# Patient Record
Sex: FEMALE | Race: WHITE | Employment: FULL TIME | URBAN - METROPOLITAN AREA
[De-identification: names, ages, dates, MRNs, and addresses within clinical notes are randomized per-mention and may not be internally consistent; named-entity substitution may affect disease eponyms.]

---

## 2017-03-11 ENCOUNTER — OFFICE VISIT (OUTPATIENT)
Dept: INTERNAL MEDICINE CLINIC | Facility: CLINIC | Age: 52
End: 2017-03-11

## 2017-03-11 VITALS
SYSTOLIC BLOOD PRESSURE: 114 MMHG | HEIGHT: 65 IN | BODY MASS INDEX: 21.83 KG/M2 | DIASTOLIC BLOOD PRESSURE: 71 MMHG | WEIGHT: 131 LBS | HEART RATE: 65 BPM

## 2017-03-11 DIAGNOSIS — Z11.9 ENCOUNTER FOR SCREENING EXAMINATION FOR INFECTIOUS DISEASE: Primary | ICD-10-CM

## 2017-03-11 PROCEDURE — 99212 OFFICE O/P EST SF 10 MIN: CPT | Performed by: INTERNAL MEDICINE

## 2017-03-11 PROCEDURE — 99213 OFFICE O/P EST LOW 20 MIN: CPT | Performed by: INTERNAL MEDICINE

## 2017-03-11 NOTE — PROGRESS NOTES
Delta Angelo is a 49year old female. 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   No specific testing is r

## 2017-11-07 ENCOUNTER — APPOINTMENT (OUTPATIENT)
Dept: OCCUPATIONAL MEDICINE | Age: 52
End: 2017-11-07
Attending: EMERGENCY MEDICINE

## 2017-11-09 ENCOUNTER — APPOINTMENT (OUTPATIENT)
Dept: OTHER | Facility: HOSPITAL | Age: 52
End: 2017-11-09
Attending: ORTHOPAEDIC SURGERY

## 2017-11-16 ENCOUNTER — APPOINTMENT (OUTPATIENT)
Dept: OTHER | Facility: HOSPITAL | Age: 52
End: 2017-11-16
Attending: EMERGENCY MEDICINE

## 2017-11-22 ENCOUNTER — APPOINTMENT (OUTPATIENT)
Dept: OTHER | Facility: HOSPITAL | Age: 52
End: 2017-11-22
Attending: EMERGENCY MEDICINE

## 2017-11-29 ENCOUNTER — APPOINTMENT (OUTPATIENT)
Dept: OTHER | Facility: HOSPITAL | Age: 52
End: 2017-11-29
Attending: EMERGENCY MEDICINE

## 2020-09-23 ENCOUNTER — NURSE TRIAGE (OUTPATIENT)
Dept: INTERNAL MEDICINE CLINIC | Facility: CLINIC | Age: 55
End: 2020-09-23

## 2020-09-23 ENCOUNTER — TELEMEDICINE (OUTPATIENT)
Dept: INTERNAL MEDICINE CLINIC | Facility: CLINIC | Age: 55
End: 2020-09-23
Payer: COMMERCIAL

## 2020-09-23 DIAGNOSIS — J01.80 ACUTE NON-RECURRENT SINUSITIS OF OTHER SINUS: Primary | ICD-10-CM

## 2020-09-23 PROCEDURE — 99213 OFFICE O/P EST LOW 20 MIN: CPT | Performed by: PHYSICIAN ASSISTANT

## 2020-09-23 RX ORDER — AZITHROMYCIN 250 MG/1
TABLET, FILM COATED ORAL
Qty: 6 TABLET | Refills: 0 | Status: SHIPPED | OUTPATIENT
Start: 2020-09-23 | End: 2020-09-28

## 2020-09-23 NOTE — TELEPHONE ENCOUNTER
Action Requested: Summary for Provider     []  Critical Lab, Recommendations Needed  [] Need Additional Advice  []   FYI    []   Need Orders  [] Need Medications Sent to Pharmacy  []  Other     SUMMARY: Doximity today with Jean Cagle.  PA    Reason for jacquelyn

## 2020-09-23 NOTE — PROGRESS NOTES
This is a telemedicine visit with live, interactive video and audio. Patient understands and accepts financial responsibility for any deductible, co-insurance and/or co-pays associated with this service.     SUBJECTIVE  Sinus pain and pressure since Sun and tongue normal; teeth and gums normal, Speaking in full sentences comfortably and Normal work of breathing       ASSESSMENT & PLAN  Diagnoses and all orders for this visit:    Acute non-recurrent sinusitis of other sinus  -fluids, warm steam, otc decong

## 2021-05-19 ENCOUNTER — TELEPHONE (OUTPATIENT)
Dept: INTERNAL MEDICINE CLINIC | Facility: CLINIC | Age: 56
End: 2021-05-19

## 2021-05-19 NOTE — TELEPHONE ENCOUNTER
Patient due for annual mammogram screening, order in EMR 2/11/21. Left message for patient to call office back, please transfer call to ext [phone] when she calls office back.

## 2021-05-28 NOTE — TELEPHONE ENCOUNTER
Patient due for annual mammogram screening, order in EMR 2/11/21.   Left message for patient to call office back, please transfer call to ext [phone] when she calls office back

## 2021-12-27 ENCOUNTER — TELEPHONE (OUTPATIENT)
Dept: INTERNAL MEDICINE CLINIC | Facility: CLINIC | Age: 56
End: 2021-12-27

## 2021-12-27 DIAGNOSIS — Z11.52 ENCOUNTER FOR SCREENING FOR COVID-19: Primary | ICD-10-CM

## 2021-12-27 NOTE — TELEPHONE ENCOUNTER
Spoke to patient. Her 's rapid test came back positive. She would like an order for Covid test.     She is mildly symptomatic with some body aches. Reviewed patient's chart for medical history and placed Covid test per protocol.

## 2022-05-08 ENCOUNTER — TELEPHONE (OUTPATIENT)
Dept: INTERNAL MEDICINE CLINIC | Facility: CLINIC | Age: 57
End: 2022-05-08

## 2022-05-09 NOTE — TELEPHONE ENCOUNTER
Please send letter  Screening for colon cancer saves lives. You are due for your colonoscopy. An order has been placed to make an appointment with our gastroenterologist team. Please call [phone]. If you are reluctant to have this procedure please  a FIT card from the lab and submit a stool sample that also screens for colon cancer.     AKHIL Pimentel  Working with Penny Yepez

## 2022-10-26 ENCOUNTER — IMMUNIZATION (OUTPATIENT)
Dept: LAB | Age: 57
End: 2022-10-26
Attending: EMERGENCY MEDICINE
Payer: COMMERCIAL

## 2022-10-26 DIAGNOSIS — Z23 NEED FOR VACCINATION: Primary | ICD-10-CM

## 2022-10-26 PROCEDURE — 0134A SARSCOV2 VAC BVL 50MCG/0.5ML: CPT

## 2023-07-28 ENCOUNTER — LAB ENCOUNTER (OUTPATIENT)
Dept: LAB | Age: 58
End: 2023-07-28
Attending: INTERNAL MEDICINE
Payer: COMMERCIAL

## 2023-07-28 DIAGNOSIS — E55.9 VITAMIN D DEFICIENCY: ICD-10-CM

## 2023-07-28 DIAGNOSIS — R53.83 FATIGUE, UNSPECIFIED TYPE: ICD-10-CM

## 2023-07-28 LAB
ALBUMIN SERPL-MCNC: 3.5 G/DL (ref 3.4–5)
ALBUMIN/GLOB SERPL: 0.9 {RATIO} (ref 1–2)
ALP LIVER SERPL-CCNC: 71 U/L
ALT SERPL-CCNC: 29 U/L
ANION GAP SERPL CALC-SCNC: 9 MMOL/L (ref 0–18)
AST SERPL-CCNC: 23 U/L (ref 15–37)
BASOPHILS # BLD AUTO: 0.02 X10(3) UL (ref 0–0.2)
BASOPHILS NFR BLD AUTO: 0.3 %
BILIRUB SERPL-MCNC: 0.4 MG/DL (ref 0.1–2)
BUN BLD-MCNC: 13 MG/DL (ref 7–18)
BUN/CREAT SERPL: 13.5 (ref 10–20)
CALCIUM BLD-MCNC: 9.8 MG/DL (ref 8.5–10.1)
CHLORIDE SERPL-SCNC: 106 MMOL/L (ref 98–112)
CO2 SERPL-SCNC: 25 MMOL/L (ref 21–32)
CREAT BLD-MCNC: 0.96 MG/DL
DEPRECATED RDW RBC AUTO: 41.4 FL (ref 35.1–46.3)
EGFRCR SERPLBLD CKD-EPI 2021: 69 ML/MIN/1.73M2 (ref 60–?)
EOSINOPHIL # BLD AUTO: 0.09 X10(3) UL (ref 0–0.7)
EOSINOPHIL NFR BLD AUTO: 1.5 %
ERYTHROCYTE [DISTWIDTH] IN BLOOD BY AUTOMATED COUNT: 12 % (ref 11–15)
FASTING STATUS PATIENT QL REPORTED: YES
GLOBULIN PLAS-MCNC: 3.7 G/DL (ref 2.8–4.4)
GLUCOSE BLD-MCNC: 84 MG/DL (ref 70–99)
HCT VFR BLD AUTO: 42.7 %
HGB BLD-MCNC: 14.3 G/DL
IMM GRANULOCYTES # BLD AUTO: 0.01 X10(3) UL (ref 0–1)
IMM GRANULOCYTES NFR BLD: 0.2 %
LYMPHOCYTES # BLD AUTO: 2 X10(3) UL (ref 1–4)
LYMPHOCYTES NFR BLD AUTO: 32.4 %
MCH RBC QN AUTO: 31.5 PG (ref 26–34)
MCHC RBC AUTO-ENTMCNC: 33.5 G/DL (ref 31–37)
MCV RBC AUTO: 94.1 FL
MONOCYTES # BLD AUTO: 0.44 X10(3) UL (ref 0.1–1)
MONOCYTES NFR BLD AUTO: 7.1 %
NEUTROPHILS # BLD AUTO: 3.61 X10 (3) UL (ref 1.5–7.7)
NEUTROPHILS # BLD AUTO: 3.61 X10(3) UL (ref 1.5–7.7)
NEUTROPHILS NFR BLD AUTO: 58.5 %
OSMOLALITY SERPL CALC.SUM OF ELEC: 289 MOSM/KG (ref 275–295)
PLATELET # BLD AUTO: 190 10(3)UL (ref 150–450)
POTASSIUM SERPL-SCNC: 4.9 MMOL/L (ref 3.5–5.1)
PROT SERPL-MCNC: 7.2 G/DL (ref 6.4–8.2)
RBC # BLD AUTO: 4.54 X10(6)UL
SODIUM SERPL-SCNC: 140 MMOL/L (ref 136–145)
TSI SER-ACNC: 1.47 MIU/ML (ref 0.36–3.74)
VIT B12 SERPL-MCNC: 247 PG/ML (ref 193–986)
VIT D+METAB SERPL-MCNC: 27.6 NG/ML (ref 30–100)
WBC # BLD AUTO: 6.2 X10(3) UL (ref 4–11)

## 2023-07-28 PROCEDURE — 80053 COMPREHEN METABOLIC PANEL: CPT

## 2023-07-28 PROCEDURE — 84443 ASSAY THYROID STIM HORMONE: CPT

## 2023-07-28 PROCEDURE — 82306 VITAMIN D 25 HYDROXY: CPT

## 2023-07-28 PROCEDURE — 36415 COLL VENOUS BLD VENIPUNCTURE: CPT

## 2023-07-28 PROCEDURE — 82607 VITAMIN B-12: CPT

## 2023-07-28 PROCEDURE — 85025 COMPLETE CBC W/AUTO DIFF WBC: CPT

## 2024-03-06 ENCOUNTER — TELEMEDICINE (OUTPATIENT)
Dept: INTERNAL MEDICINE CLINIC | Facility: CLINIC | Age: 59
End: 2024-03-06

## 2024-03-06 ENCOUNTER — TELEPHONE (OUTPATIENT)
Dept: INTERNAL MEDICINE CLINIC | Facility: CLINIC | Age: 59
End: 2024-03-06

## 2024-03-06 DIAGNOSIS — H57.11 DISCOMFORT OF RIGHT EYE: Primary | ICD-10-CM

## 2024-03-06 PROCEDURE — 99213 OFFICE O/P EST LOW 20 MIN: CPT | Performed by: INTERNAL MEDICINE

## 2024-03-06 NOTE — PROGRESS NOTES
Virtual Virtual Check-In    Irene Mcrae verbally consents to a Virtual Video Check-In service on 03/06/24. Patient understands and accepts financial responsibility for any deductible, co-insurance and/or co-pays associated with this service. This visit is conducted using Telemedicine with live, interactive video and audio.     I spoke with Irene Mcrae by secure video chat, verified date of birth, and discussed their current concerns:   Duration: 10 minutes    HPI  58-year-old lady requesting a telemedicine consultation for a foreign body sensation in the right eye.  She thought it is conjunctivitis.  She has no redness or purulent discharge.  She feels something is there in the eye and just irritation.  No eye pain.  No blurry vision.    Review of Systems:   Review of Systems   Constitutional:  Negative for fever.   HENT:  Negative for congestion.    Eyes:  Positive for itching. Negative for photophobia, pain, redness and visual disturbance.   Respiratory:  Negative for cough and shortness of breath.    Cardiovascular:  Negative for chest pain.   Psychiatric/Behavioral:  Negative for behavioral problems.           Reviewed Active Problems:  There are no problems to display for this patient.     Reviewed Past Medical History:  Past Medical History:   Diagnosis Date    Dry eyes     per NG: \"(?etiology)\" - restasis drops, 1974    Lipid screening 7/25/2011      Reviewed Family History:  Family History   Problem Relation Age of Onset    Dementia Mother         Alzheimer's Disease    Alcohol and Other Disorders Associated Mother         alcoholism    Heart Disease Father 71        CAD    Clotting Disorder Father         blood clot in renal artery    Heart Disorder Sister         endocarditis, 8/2011    Asthma Son        Reviewed Social History:  Social History     Socioeconomic History    Marital status:    Tobacco Use    Smoking status: Never    Smokeless tobacco: Never   Substance and Sexual  Activity    Alcohol use: Yes     Comment: social     Drug use: No    Sexual activity: Yes     Partners: Male     Birth control/protection: Vasectomy     Comment: 10/17/19: current    Other Topics Concern    Caffeine Concern Yes     Comment: coffee, 4 cups/day      Reviewed Current Medications:  Current Outpatient Medications   Medication Sig Dispense Refill    escitalopram 10 MG Oral Tab Take 1 tablet (10 mg total) by mouth daily. 30 tablet 5    PREMPRO 0.625-5 MG Oral Tab TAKE 1 TABLET BY MOUTH EVERY DAY 84 tablet 0          Physical Exam  There were no vitals filed for this visit.  Physical Exam   Constitutional:       General: He is not in acute distress.     Appearance: Normal appearance.   HENT:      Head: Normocephalic and atraumatic.      Nose: Nose normal.   Neurological:      Mental Status: He is alert and oriented to person, place, and time.   Psychiatric:         Behavior: Behavior normal.         Thought Content: Thought content normal.         Judgment: Judgment normal.     Eye exam through the video  No redness noted.  Extraocular movements intact.    Diagnosis:  1. Discomfort of right eye  Most likely foreign body -as there is no redness or purulent discharge, I have reassured her that it is not conjunctivitis.  If she develops anything, she will contact me.  We will do artificial tears.  If there is no improvement, she needs ophthalmology evaluation.  She will contact ophthalmology for an evaluation volodymyr make an appointment to see for next week.     Plan: As above.  I have discussed with her regarding mammography.  She has completed colonoscopy in duly.  We will update the health maintenance.    Follow up: No follow-ups on file.    Please note that the following visit was completed using two-way, real-time interactive audio and/or video communication.  This has been done in good ryan to provide continuity of care in the best interest of the provider-patient relationship, due to the ongoing public  health crisis/national emergency and because of restrictions of visitation.  There are limitations of this visit as no physical exam could be performed.  Every conscious effort was taken to allow for sufficient and adequate time.  This billing was spent on reviewing labs, medications, radiology tests and decision making.  Appropriate medical decision-making and tests are ordered as detailed in the plan of care above. Irene TRAVIS Mcrae understands video evaluation is not a substitute for in person examination or emergency care. Patient advised to go to ER or call 911 for worsening symptoms or acute distress.     This note was prepared using Dragon Medical voice recognition dictation software. As a result errors may occur. When identified these errors have been corrected. While every attempt is made to correct errors during dictation discrepancies may still exist.  Raphael Calabrese MD

## 2024-03-06 NOTE — TELEPHONE ENCOUNTER
Verified name and .    Patient states she woke up with discomfort and drainage to right eye. She is requesting prescription to treat pink eye.    Appointment scheduled:  Future Appointments   Date Time Provider Department Center   3/6/2024  2:15 PM Raphael Calabrese MD [facility]   3/20/2024  9:00 AM Raphael Park MD [facility]

## 2024-03-20 ENCOUNTER — OFFICE VISIT (OUTPATIENT)
Dept: INTERNAL MEDICINE CLINIC | Facility: CLINIC | Age: 59
End: 2024-03-20
Payer: COMMERCIAL

## 2024-03-20 VITALS
RESPIRATION RATE: 16 BRPM | DIASTOLIC BLOOD PRESSURE: 70 MMHG | BODY MASS INDEX: 25.79 KG/M2 | SYSTOLIC BLOOD PRESSURE: 110 MMHG | HEART RATE: 78 BPM | WEIGHT: 154.81 LBS | TEMPERATURE: 98 F | HEIGHT: 65 IN

## 2024-03-20 DIAGNOSIS — E55.9 VITAMIN D DEFICIENCY: ICD-10-CM

## 2024-03-20 DIAGNOSIS — Z12.31 ENCOUNTER FOR SCREENING MAMMOGRAM FOR BREAST CANCER: ICD-10-CM

## 2024-03-20 DIAGNOSIS — D12.6 ADENOMATOUS POLYP OF COLON, UNSPECIFIED PART OF COLON: ICD-10-CM

## 2024-03-20 DIAGNOSIS — F32.1 CURRENT MODERATE EPISODE OF MAJOR DEPRESSIVE DISORDER WITHOUT PRIOR EPISODE (HCC): ICD-10-CM

## 2024-03-20 DIAGNOSIS — Z23 NEED FOR VACCINATION: ICD-10-CM

## 2024-03-20 DIAGNOSIS — Z00.00 ROUTINE PHYSICAL EXAMINATION: Primary | ICD-10-CM

## 2024-03-20 PROCEDURE — 99396 PREV VISIT EST AGE 40-64: CPT | Performed by: INTERNAL MEDICINE

## 2024-03-20 PROCEDURE — 90471 IMMUNIZATION ADMIN: CPT | Performed by: INTERNAL MEDICINE

## 2024-03-20 PROCEDURE — 90715 TDAP VACCINE 7 YRS/> IM: CPT | Performed by: INTERNAL MEDICINE

## 2024-03-20 RX ORDER — ESCITALOPRAM OXALATE 20 MG/1
20 TABLET ORAL DAILY
Qty: 90 TABLET | Refills: 1 | Status: SHIPPED | OUTPATIENT
Start: 2024-03-20

## 2024-03-20 NOTE — PROGRESS NOTES
HPI:    Patient ID: Irene Mcrae is a 58 year old female.    HPI  Patient is here requesting a physical exam.  Last seen here in December of last year for follow-up on depression and anxiety.  At that time we continued the same dose of escitalopram 10 mg a day.  She was doing counseling at that time.  Current medications reviewed.  Health maintenance and vaccination status reviewed.  Patient had blood work done in July of last year for fatigue.  Unremarkable other than slightly low vitamin D level.     Pt is doing well. SHe feels good physically. Sometimes still a struggle with the mood; has issues with anxiety and depression. Still seeing a counselor. Her mornings can be tough for her. Sometimes has issues with leaving the house. No suicidal thoughts. Diet is good for dinner but inconsistent otherwise; she feels it is generally healthy. Not a lot of exercise. Weight is stable. No tobacco. EtOH is one glass of wine a day. No other drug use.  In reviewing the chart, the patient had colonoscopy October 7, 2022.  They revealed 1 tubular adenoma polyp.  Not clear what the time course was on follow-up.      There is no problem list on file for this patient.         HISTORY:  Past Medical History:   Diagnosis Date    Dry eyes     per NG: \"(?etiology)\" - restasis drops, 1974    Lipid screening 7/25/2011      Past Surgical History:   Procedure Laterality Date    D & C  2003    miscarriage    INNER EAR SURGERY PROC UNLISTED      bilateral myringotomy and tube placement    OTHER SURGICAL HISTORY  2005    cervical polypectomy    OTHER SURGICAL HISTORY  2005    per NG: fibroidectomy    T&A      TONSILLECTOMY        Family History   Problem Relation Age of Onset    Dementia Mother         Alzheimer's Disease    Alcohol and Other Disorders Associated Mother         alcoholism    Heart Disease Father 71        CAD    Clotting Disorder Father         blood clot in renal artery    Heart Disorder Sister         endocarditis,  8/2011    Asthma Son       Social History     Socioeconomic History    Marital status:    Tobacco Use    Smoking status: Never    Smokeless tobacco: Never   Vaping Use    Vaping Use: Never used   Substance and Sexual Activity    Alcohol use: Yes     Alcohol/week: 7.0 standard drinks of alcohol     Types: 7 Glasses of wine per week     Comment: social     Drug use: No    Sexual activity: Yes     Partners: Male     Birth control/protection: Vasectomy     Comment: 10/17/19: current    Other Topics Concern    Caffeine Concern Yes     Comment: coffee, 4 cups/day          Review of Systems          Current Outpatient Medications   Medication Sig Dispense Refill    escitalopram 10 MG Oral Tab Take 1 tablet (10 mg total) by mouth daily. 30 tablet 5    PREMPRO 0.625-5 MG Oral Tab TAKE 1 TABLET BY MOUTH EVERY DAY 84 tablet 0     Allergies:  Allergies   Allergen Reactions    Shellfish Allergy RASH    Latex SHORTNESS OF BREATH    Shellfish-Derived Products RASH and SWELLING        PHYSICAL EXAM:   /70 (BP Location: Left arm, Patient Position: Sitting, Cuff Size: large)   Pulse 78   Temp 97.8 °F (36.6 °C) (Other)   Resp 16   Ht 5' 5\" (1.651 m)   Wt 154 lb 12.8 oz (70.2 kg)   LMP 02/15/2015   BMI 25.76 kg/m²      Physical Exam  Constitutional:       Appearance: Normal appearance. She is well-developed.   HENT:      Right Ear: Tympanic membrane and ear canal normal.      Left Ear: Tympanic membrane and ear canal normal.      Nose: Nose normal.      Mouth/Throat:      Pharynx: No oropharyngeal exudate or posterior oropharyngeal erythema.   Eyes:      Conjunctiva/sclera: Conjunctivae normal.      Pupils: Pupils are equal, round, and reactive to light.   Neck:      Thyroid: No thyromegaly.      Vascular: No carotid bruit.   Cardiovascular:      Rate and Rhythm: Normal rate.      Pulses: Normal pulses.      Heart sounds: Normal heart sounds. No murmur heard.     No friction rub. No gallop.   Pulmonary:      Effort:  Pulmonary effort is normal.      Breath sounds: Normal breath sounds. No wheezing or rales.   Abdominal:      General: Bowel sounds are normal.      Palpations: Abdomen is soft. There is no mass.      Tenderness: There is no abdominal tenderness.   Musculoskeletal:         General: No tenderness.      Cervical back: Neck supple.      Right lower leg: No edema.      Left lower leg: No edema.   Lymphadenopathy:      Cervical: No cervical adenopathy.   Skin:     General: Skin is warm and dry.      Findings: No rash.   Neurological:      General: No focal deficit present.      Mental Status: She is alert.      Cranial Nerves: No cranial nerve deficit.      Coordination: Coordination normal.   Psychiatric:         Mood and Affect: Mood normal.         Behavior: Behavior normal.         Thought Content: Thought content normal.         Judgment: Judgment normal.          Wt Readings from Last 6 Encounters:   03/20/24 154 lb 12.8 oz (70.2 kg)   12/18/23 153 lb (69.4 kg)   07/28/23 154 lb 3.2 oz (69.9 kg)   02/11/21 150 lb (68 kg)   10/17/19 150 lb (68 kg)   03/11/17 131 lb (59.4 kg)             ASSESSMENT/PLAN:   1. Routine physical examination  Physical exam is unremarkable.  Active issues as below.  Health maintenance issues reviewed.  Will check fasting blood work and contact patient with results.  Strongly encouraged increase in exercise as part of an overall approach to improve her physical and mental wellbeing.  Discussed the importance of proper diet, adequate sleep, stress management.  But in particular, she should focus on exercise.  - CBC With Differential With Platelet; Future  - Comp Metabolic Panel (14); Future  - Lipid Panel; Future  - TSH W Reflex To Free T4; Future  - Vitamin D; Future    2. Current moderate episode of major depressive disorder without prior episode (HCC)  Somewhat controlled but not great.  Various options discussed.  Increase Lexapro from 10 up to 20 mg a day.  Continue following up with  counselor/therapist.  Follow-up here in about 2 months to recheck with her control.    3. Vitamin D deficiency  Check levels.  Adjust supplementation if needed.  - Vitamin D; Future    4. Adenomatous polyp of colon, unspecified part of colon  Colonoscopy done in October 2022.  Patient advised to track down the recommendations of the GI doctor at that time in regard to repeat colonoscopy.      5. Need for vaccination   Vaccination status reviewed.  Given tetanus booster today.  She will likely return at some point in the future for Shingles shot.  - TdaP (Boostrix/Adacel) Vaccine (> 7 Y)    6. Encounter for screening mammogram for breast cancer  Given order for mammogram.  Advised to follow-up with gynecology or return here for nurse practitioner pelvic exam.  - [facility] JASON 2D+3D SCREENING BILAT (CPT=77067/25493); Future         Meds This Visit:  Requested Prescriptions      No prescriptions requested or ordered in this encounter       Imaging & Referrals:  None         Raphael Park MD

## 2024-07-31 ENCOUNTER — HOSPITAL ENCOUNTER (OUTPATIENT)
Dept: MAMMOGRAPHY | Facility: HOSPITAL | Age: 59
Discharge: HOME | End: 2024-07-31
Attending: INTERNAL MEDICINE
Payer: COMMERCIAL

## 2024-07-31 DIAGNOSIS — Z12.31 ENCOUNTER FOR SCREENING MAMMOGRAM FOR BREAST CANCER: ICD-10-CM

## 2024-07-31 PROCEDURE — 77067 SCR MAMMO BI INCL CAD: CPT | Performed by: INTERNAL MEDICINE

## 2024-07-31 PROCEDURE — 77063 BREAST TOMOSYNTHESIS BI: CPT | Performed by: INTERNAL MEDICINE

## 2024-09-20 RX ORDER — ESCITALOPRAM OXALATE 20 MG/1
20 TABLET ORAL DAILY
Qty: 90 TABLET | Refills: 0 | Status: SHIPPED | OUTPATIENT
Start: 2024-09-20

## 2024-09-20 NOTE — TELEPHONE ENCOUNTER
Please review; protocol failed/No Protocol  appointment only     Sending 90 day supply last appointment was 6 months ago    Sent [employer] message to patient to schedule an appointment.     Requested Prescriptions   Pending Prescriptions Disp Refills    ESCITALOPRAM 20 MG Oral Tab [Pharmacy Med Name: ESCITALOPRAM 20 MG TABLET] 90 tablet 1     Sig: TAKE 1 TABLET BY MOUTH EVERY DAY       Psychiatric Non-Scheduled (Anti-Anxiety) Failed - 9/17/2024 12:21 AM        Failed - In person appointment or virtual visit in the past 6 mos or appointment in next 3 mos     Recent Outpatient Visits              6 months [facility]    [facility]    6 months ago Discomfort of right eye    [facility]    [facility]    9 months ago Current moderate episode of major depressive disorder without prior episode (HCC)    [facility] Raphael Henry MD    Office Visit    1 year ago Current moderate episode of major depressive disorder without prior episode (HCC)    [facility]    Office Visit    3 years ago Encounter for gynecological examination without abnormal finding    [facility]    [facility]                      Passed - Depression Screening completed within the past 12 months             Recent Outpatient Visits              6 months ago Routine physical examination    [facility]    Office Visit    6 months ago Discomfort of right eye    [facility]    [facility]    9 months ago Current moderate episode of major depressive disorder without prior episode (HCC)    [facility], [facility], Raphael Henry MD    Office Visit    1 year ago Current moderate episode of major depressive disorder without prior episode (HCC)    [facility], [facility], Raphael Henry MD    Office Visit    3 years ago Encounter for gynecological examination without abnormal finding    [facility] - [facility] Roxane Luu MD    Office Visit

## 2024-12-23 ENCOUNTER — TELEPHONE (OUTPATIENT)
Dept: INTERNAL MEDICINE CLINIC | Facility: CLINIC | Age: 59
End: 2024-12-23

## 2024-12-27 NOTE — TELEPHONE ENCOUNTER
Please review. Protocol Failed; No Protocol    No future appointments.    [employer] message sent to patient to schedule an office visit with Provider and/or  Routed to Patient  for assistance with appointment.     Requested Prescriptions   Pending Prescriptions Disp Refills    ESCITALOPRAM 20 MG Oral Tab [Pharmacy Med Name: ESCITALOPRAM 20 MG TABLET] 90 tablet 0     Sig: TAKE 1 TABLET BY MOUTH EVERY DAY       Psychiatric Non-Scheduled (Anti-Anxiety) Failed - 12/27/2024  4:46 PM        Failed - In person appointment or virtual visit in the past 6 mos or appointment in next 3 mos     Recent Outpatient Visits              9 months ago [facility]    [facility]    9 months ago Discomfort of right eye    [facility]    [facility]    1 year ago Current moderate episode of major depressive disorder without prior episode (HCC)    [facility], Raphael Henry MD    Office Visit    1 year ago Current moderate episode of major depressive disorder without prior episode (HCC)    [facility]    [facility] Visit    3 years ago Encounter for gynecological examination without abnormal finding    [facility] Kimberly J, [facility]    Office Visit                      Passed - Depression Screening completed within the past 12 months                 Recent Outpatient Visits              9 months ago Routine physical examination    [facility]    Office Visit    9 months ago Discomfort of right eye    [facility]    [facility]    1 year ago Current moderate episode of major depressive disorder  without prior episode (HCC)    [facility], [facility], Raphael Henry MD    Office Visit    1 year ago Current moderate episode of major depressive disorder without prior episode (HCC)    [facility], Raphael Henry MD    Office Visit    3 years ago Encounter for gynecological examination without abnormal finding    [facility]    [facility] Visit

## 2024-12-29 RX ORDER — ESCITALOPRAM OXALATE 20 MG/1
20 TABLET ORAL DAILY
Qty: 30 TABLET | Refills: 1 | Status: SHIPPED | OUTPATIENT
Start: 2024-12-29 | End: 2025-01-30

## 2025-02-01 RX ORDER — ESCITALOPRAM OXALATE 20 MG/1
20 TABLET ORAL DAILY
Qty: 30 TABLET | Refills: 1 | Status: SHIPPED | OUTPATIENT
Start: 2025-02-01

## 2025-02-01 NOTE — TELEPHONE ENCOUNTER
To reception staff, patient is over due for  f/u appt.   Also [employer] message sent to pt   See telephone encounter 12-23-24.  Thanks     Refill Passed Per Protocol    Requested Prescriptions   Pending Prescriptions Disp Refills    escitalopram 20 MG Oral Tab 30 tablet 1     Sig: Take 1 tablet (20 mg total) by mouth daily.       Psychiatric Non-Scheduled (Anti-Anxiety) Passed - 1/31/2025 10:59 PM        Passed - In person appointment or virtual visit in the past 6 mos or appointment in next 3 mos     Recent Outpatient Visits              10 months ago [facility]    [facility]    11 months ago Discomfort of right eye    [facility]    [facility]    1 year ago Current moderate episode of major depressive disorder without prior episode (HCC)    [facility] Raphael Park MD    Office Visit    1 year ago Current moderate episode of major depressive disorder without prior episode (HCC)    [facility] Raphael [facility] MD    Office Visit    3 years ago Encounter for gynecological examination without abnormal finding    [facility]    [facility]          Future Appointments         Provider Department Appt Notes    In 2 months [facility]                    Passed - Depression Screening completed within the past 12 months        Passed - Medication is active on med list             Future Appointments         Provider Department Appt Notes    In 2 months [facility]          Recent Outpatient Visits              10 months ago Routine physical examination    [place]  [facility], [facility], Raphael Henry MD    Office Visit    11 months ago Discomfort of right eye    [facility], Raphael Gallardo MD    Telemedicine    1 year ago Current moderate episode of major depressive disorder without prior episode (HCC)    [facility], Raphael Henry MD    Office Visit    1 year ago Current moderate episode of major depressive disorder without prior episode (HCC)    [facility], Raphael Henry MD    Office Visit    3 years ago Encounter for gynecological examination without abnormal finding    [facility]    [facility] Visit

## 2025-04-30 ENCOUNTER — OFFICE VISIT (OUTPATIENT)
Dept: INTERNAL MEDICINE CLINIC | Facility: CLINIC | Age: 60
End: 2025-04-30

## 2025-04-30 ENCOUNTER — LAB ENCOUNTER (OUTPATIENT)
Dept: LAB | Age: 60
End: 2025-04-30
Attending: INTERNAL MEDICINE
Payer: COMMERCIAL

## 2025-04-30 VITALS
HEART RATE: 78 BPM | TEMPERATURE: 98 F | RESPIRATION RATE: 18 BRPM | OXYGEN SATURATION: 98 % | SYSTOLIC BLOOD PRESSURE: 102 MMHG | HEIGHT: 65 IN | WEIGHT: 162 LBS | BODY MASS INDEX: 26.99 KG/M2 | DIASTOLIC BLOOD PRESSURE: 68 MMHG

## 2025-04-30 DIAGNOSIS — Z00.00 ROUTINE PHYSICAL EXAMINATION: ICD-10-CM

## 2025-04-30 DIAGNOSIS — Z00.00 ROUTINE PHYSICAL EXAMINATION: Primary | ICD-10-CM

## 2025-04-30 DIAGNOSIS — D12.6 ADENOMATOUS POLYP OF COLON, UNSPECIFIED PART OF COLON: ICD-10-CM

## 2025-04-30 DIAGNOSIS — F32.1 CURRENT MODERATE EPISODE OF MAJOR DEPRESSIVE DISORDER WITHOUT PRIOR EPISODE (HCC): ICD-10-CM

## 2025-04-30 LAB
ALBUMIN SERPL-MCNC: 4.2 G/DL (ref 3.2–4.8)
ALBUMIN/GLOB SERPL: 1.3 {RATIO} (ref 1–2)
ALP LIVER SERPL-CCNC: 88 U/L (ref 46–118)
ALT SERPL-CCNC: 13 U/L (ref 10–49)
ANION GAP SERPL CALC-SCNC: 8 MMOL/L (ref 0–18)
AST SERPL-CCNC: 23 U/L (ref ?–34)
BASOPHILS # BLD AUTO: 0.02 X10(3) UL (ref 0–0.2)
BASOPHILS NFR BLD AUTO: 0.3 %
BILIRUB SERPL-MCNC: 0.3 MG/DL (ref 0.3–1.2)
BUN BLD-MCNC: 10 MG/DL (ref 9–23)
BUN/CREAT SERPL: 10.1 (ref 10–20)
CALCIUM BLD-MCNC: 9.3 MG/DL (ref 8.7–10.4)
CHLORIDE SERPL-SCNC: 104 MMOL/L (ref 98–112)
CHOLEST SERPL-MCNC: 236 MG/DL (ref ?–200)
CO2 SERPL-SCNC: 27 MMOL/L (ref 21–32)
CREAT BLD-MCNC: 0.99 MG/DL (ref 0.55–1.02)
DEPRECATED RDW RBC AUTO: 40.7 FL (ref 35.1–46.3)
EGFRCR SERPLBLD CKD-EPI 2021: 66 ML/MIN/1.73M2 (ref 60–?)
EOSINOPHIL # BLD AUTO: 0.27 X10(3) UL (ref 0–0.7)
EOSINOPHIL NFR BLD AUTO: 4.4 %
ERYTHROCYTE [DISTWIDTH] IN BLOOD BY AUTOMATED COUNT: 11.9 % (ref 11–15)
FASTING PATIENT LIPID ANSWER: YES
FASTING STATUS PATIENT QL REPORTED: YES
GLOBULIN PLAS-MCNC: 3.2 G/DL (ref 2–3.5)
GLUCOSE BLD-MCNC: 78 MG/DL (ref 70–99)
HCT VFR BLD AUTO: 43.7 % (ref 35–48)
HDLC SERPL-MCNC: 69 MG/DL (ref 40–59)
HGB BLD-MCNC: 14.6 G/DL (ref 12–16)
IMM GRANULOCYTES # BLD AUTO: 0.01 X10(3) UL (ref 0–1)
IMM GRANULOCYTES NFR BLD: 0.2 %
LDLC SERPL CALC-MCNC: 133 MG/DL (ref ?–100)
LYMPHOCYTES # BLD AUTO: 2.04 X10(3) UL (ref 1–4)
LYMPHOCYTES NFR BLD AUTO: 33.1 %
MCH RBC QN AUTO: 31 PG (ref 26–34)
MCHC RBC AUTO-ENTMCNC: 33.4 G/DL (ref 31–37)
MCV RBC AUTO: 92.8 FL (ref 80–100)
MONOCYTES # BLD AUTO: 0.51 X10(3) UL (ref 0.1–1)
MONOCYTES NFR BLD AUTO: 8.3 %
NEUTROPHILS # BLD AUTO: 3.32 X10 (3) UL (ref 1.5–7.7)
NEUTROPHILS # BLD AUTO: 3.32 X10(3) UL (ref 1.5–7.7)
NEUTROPHILS NFR BLD AUTO: 53.7 %
NONHDLC SERPL-MCNC: 167 MG/DL (ref ?–130)
OSMOLALITY SERPL CALC.SUM OF ELEC: 286 MOSM/KG (ref 275–295)
PLATELET # BLD AUTO: 285 10(3)UL (ref 150–450)
POTASSIUM SERPL-SCNC: 4.3 MMOL/L (ref 3.5–5.1)
PROT SERPL-MCNC: 7.4 G/DL (ref 5.7–8.2)
RBC # BLD AUTO: 4.71 X10(6)UL (ref 3.8–5.3)
SODIUM SERPL-SCNC: 139 MMOL/L (ref 136–145)
TRIGL SERPL-MCNC: 193 MG/DL (ref 30–149)
TSI SER-ACNC: 1.46 UIU/ML (ref 0.55–4.78)
VIT B12 SERPL-MCNC: 354 PG/ML (ref 211–911)
VLDLC SERPL CALC-MCNC: 35 MG/DL (ref 0–30)
WBC # BLD AUTO: 6.2 X10(3) UL (ref 4–11)

## 2025-04-30 PROCEDURE — 99396 PREV VISIT EST AGE 40-64: CPT | Performed by: INTERNAL MEDICINE

## 2025-04-30 PROCEDURE — 80053 COMPREHEN METABOLIC PANEL: CPT

## 2025-04-30 PROCEDURE — 80061 LIPID PANEL: CPT

## 2025-04-30 PROCEDURE — 85025 COMPLETE CBC W/AUTO DIFF WBC: CPT

## 2025-04-30 PROCEDURE — 36415 COLL VENOUS BLD VENIPUNCTURE: CPT

## 2025-04-30 PROCEDURE — 82607 VITAMIN B-12: CPT

## 2025-04-30 PROCEDURE — 84443 ASSAY THYROID STIM HORMONE: CPT

## 2025-04-30 RX ORDER — ESCITALOPRAM OXALATE 20 MG/1
20 TABLET ORAL DAILY
Qty: 90 TABLET | Refills: 3 | Status: SHIPPED | OUTPATIENT
Start: 2025-04-30

## 2025-04-30 NOTE — PATIENT INSTRUCTIONS
Contact the office of Dr Amol Lal ([facility]) and find out when you need to repeat the colonoscopy.

## 2025-04-30 NOTE — PROGRESS NOTES
HPI:    Patient ID: Irene Mcrae is a 59 year old female.    HPI    Patient returns to the office today requesting general physical exam as well as  to discuss chronic medical issues as listed on the active problem list below.  Patient last seen in the office by me on March 20 of last year for physical exam.  Having some issues with anxiety and depression at that time.  During the last visit, the following changes were made: Increase in Lexapro from 10 up to 20 mg a day.  Since the last visit, the patient has seen the following doctors: Gynecology. Still seeing counselor every other week.    Today, the patient offers the following complaints: increased stress with  out of a job. Her mood is better. The increased dose of med is helping. She works a lot.   Weight is up 8 pounds from last visit.   Patient describes diet as good.   For exercise, the patient is not that active.   Tobacco and alcohol use reviewed.   Current medications reviewed.   Health maintenance issues reviewed.      Wt Readings from Last 6 Encounters:   04/30/25 162 lb (73.5 kg)   03/20/24 154 lb 12.8 oz (70.2 kg)   12/18/23 153 lb (69.4 kg)   07/28/23 154 lb 3.2 oz (69.9 kg)   02/11/21 150 lb (68 kg)   10/17/19 150 lb (68 kg)       Problem List[1]     HISTORY:  Past Medical History[2]   Past Surgical History[3]   Family History[4]   Short Social Hx on File[5]       Review of Systems        Current Medications[6]  Allergies:Allergies[7]     PHYSICAL EXAM:   /68 (BP Location: Right arm, Patient Position: Sitting, Cuff Size: large)   Pulse 78   Temp 98 °F (36.7 °C) (Other)   Resp 18   Ht 5' 5\" (1.651 m)   Wt 162 lb (73.5 kg)   LMP 02/15/2015   SpO2 98%   BMI 26.96 kg/m²      Physical Exam  Constitutional:       Appearance: Normal appearance. She is well-developed.   HENT:      Right Ear: Tympanic membrane and ear canal normal.      Left Ear: Tympanic membrane and ear canal normal.      Nose: Nose normal.      Mouth/Throat:       Pharynx: No oropharyngeal exudate or posterior oropharyngeal erythema.   Eyes:      Conjunctiva/sclera: Conjunctivae normal.      Pupils: Pupils are equal, round, and reactive to light.   Neck:      Thyroid: No thyromegaly.      Vascular: No carotid bruit.   Cardiovascular:      Rate and Rhythm: Normal rate.      Pulses: Normal pulses.      Heart sounds: Normal heart sounds. No murmur heard.     No friction rub. No gallop.   Pulmonary:      Effort: Pulmonary effort is normal.      Breath sounds: Normal breath sounds. No wheezing or rales.   Abdominal:      General: Bowel sounds are normal.      Palpations: Abdomen is soft. There is no mass.      Tenderness: There is no abdominal tenderness.   Musculoskeletal:         General: No tenderness.      Cervical back: Neck supple.      Right lower leg: No edema.      Left lower leg: No edema.   Lymphadenopathy:      Cervical: No cervical adenopathy.   Skin:     General: Skin is warm and dry.      Findings: No rash.   Neurological:      General: No focal deficit present.      Mental Status: She is alert.      Cranial Nerves: No cranial nerve deficit.      Coordination: Coordination normal.   Psychiatric:         Mood and Affect: Mood normal.         Behavior: Behavior normal.         Thought Content: Thought content normal.         Judgment: Judgment normal.                 ASSESSMENT/PLAN:   1. Routine physical examination physical exam is  Physical exam is unremarkable.  Active issues as below.  Health maintenance issues reviewed.  Will check fasting blood work and contact patient with results.  Patient's weight is up somewhat.  She is in the overweight category.  She wonders about weight loss medications.  She does not overweight enough to warrant those medications.  We did discuss the importance of a long-term commitment to diet, exercise, weight loss.  I think for her, she would do much better with regular exercise.  It would help her weight and also help her  physically and psychologically.  At minimum, lose the 8 pounds she is gained in the last year.  - CBC With Differential With Platelet; Future  - Comp Metabolic Panel (14); Future  - Lipid Panel; Future  - TSH W Reflex To Free T4; Future  - Vitamin B12; Future    2. Current moderate episode of major depressive disorder without prior episode (HCC)  Better on the Lexapro 20 mg a day.  Some increase stress at home recently.  Continue current dose of medication.  Continue regular follow-up with her counselor.    3. Adenomatous polyp of colon, unspecified part of colon  Colonoscopy done in October 2022.  Found tubular adenoma.  It is not clear from the notes when the GI doctor wishes a repeat colonoscopy.  Patient advised to contact their office and clear that up.        Meds This Visit:  Requested Prescriptions     Pending Prescriptions Disp Refills    escitalopram 20 MG Oral Tab 30 tablet 1     Sig: Take 1 tablet (20 mg total) by mouth daily.       Imaging & Referrals:  None         Raphael Park MD        [1]   Patient Active Problem List  Diagnosis    Current moderate episode of major depressive disorder without prior episode (HCC)    Vitamin D deficiency    Adenomatous polyp of colon   [2]   Past Medical History:   Dry eyes    per NG: \"(?etiology)\" - restasis drops, 1974    Lipid screening   [3]   Past Surgical History:  Procedure Laterality Date    D & c  2003    miscarriage    Inner ear surgery proc unlisted      bilateral myringotomy and tube placement    Other surgical history  2005    cervical polypectomy    Other surgical history  2005    per NG: fibroidectomy    T&a      Tonsillectomy     [4]   Family History  Problem Relation Age of Onset    Dementia Mother         Alzheimer's Disease    Alcohol and Other Disorders Associated Mother         alcoholism    Heart Disease Father 71        CAD    Clotting Disorder Father         blood clot in renal artery    Heart Disorder Sister         endocarditis, 8/2011     Asthma Son    [5]   Social History  Socioeconomic History    Marital status:    Tobacco Use    Smoking status: Never    Smokeless tobacco: Never   Vaping Use    Vaping status: Never Used   Substance and Sexual Activity    Alcohol use: Yes     Alcohol/week: 7.0 standard drinks of alcohol     Types: 7 Glasses of wine per week     Comment: social     Drug use: No    Sexual activity: Yes     Partners: Male     Birth control/protection: Vasectomy     Comment: 10/17/19: current    Other Topics Concern    Caffeine Concern Yes     Comment: coffee, 4 cups/day     Social Drivers of Health     Food Insecurity: No Food Insecurity (4/30/2025)    NCSS - Food Insecurity     Worried About Running Out of Food in the Last Year: No     Ran Out of Food in the Last Year: No   Transportation Needs: No Transportation Needs (4/30/2025)    NCSS - Transportation     Lack of Transportation: No   Housing Stability: Not At Risk (4/30/2025)    NCSS - Housing/Utilities     Has Housing: Yes     Worried About Losing Housing: No     Unable to Get Utilities: No   [6]   Current Outpatient Medications   Medication Sig Dispense Refill    escitalopram 20 MG Oral Tab Take 1 tablet (20 mg total) by mouth daily. 30 tablet 1    PREMPRO 0.625-5 MG Oral Tab TAKE 1 TABLET BY MOUTH EVERY DAY 84 tablet 0   [7]   Allergies  Allergen Reactions    Shellfish Allergy RASH    Latex SHORTNESS OF BREATH    Shellfish-Derived Products RASH and SWELLING

## 2025-05-15 ENCOUNTER — NURSE TRIAGE (OUTPATIENT)
Dept: INTERNAL MEDICINE CLINIC | Facility: CLINIC | Age: 60
End: 2025-05-15

## 2025-05-15 NOTE — TELEPHONE ENCOUNTER
Action Requested: Summary for Provider     []  Critical Lab, Recommendations Needed  [] Need Additional Advice  []   FYI    []   Need Orders  [] Need Medications Sent to Pharmacy  []  Other     SUMMARY: states she has been having pain on her lower left side for about a week. Does not remember injuring it, does not have any urinary symptoms. Scheduled an appointment to be seen tomorrow.     Reason for call: Back Pain  Onset: one week    The patient called stating she has had a back ache for about a week now. The said the pain is on her left lower side. She is not having any urinary issues. She does not recall injuring it at all. It does not hurt when she is sitting still, only when she is moving. No abdominal pain or numbness or tingling. She said the pain comes and goes and sometimes is worse then others but not severe. She has never had back pain before. Scheduled for an appointment tomorrow,     Future Appointments   Date Time Provider Department Center   5/16/2025  2:00 PM Shannan Milan APRN [facility]     Reason for Disposition   MODERATE back pain (e.g., interferes with normal activities) and present > 3 days    Protocols used: Back Pain-A-[place]

## 2025-05-16 ENCOUNTER — OFFICE VISIT (OUTPATIENT)
Dept: INTERNAL MEDICINE CLINIC | Facility: CLINIC | Age: 60
End: 2025-05-16
Payer: COMMERCIAL

## 2025-05-16 VITALS
SYSTOLIC BLOOD PRESSURE: 100 MMHG | BODY MASS INDEX: 27.49 KG/M2 | WEIGHT: 165 LBS | HEIGHT: 65 IN | TEMPERATURE: 98 F | HEART RATE: 80 BPM | OXYGEN SATURATION: 97 % | DIASTOLIC BLOOD PRESSURE: 68 MMHG

## 2025-05-16 DIAGNOSIS — M54.50 ACUTE LEFT-SIDED LOW BACK PAIN WITHOUT SCIATICA: Primary | ICD-10-CM

## 2025-05-16 DIAGNOSIS — M89.8X8 ILIAC CREST BONE PAIN: ICD-10-CM

## 2025-05-16 PROCEDURE — 99213 OFFICE O/P EST LOW 20 MIN: CPT | Performed by: NURSE PRACTITIONER

## 2025-05-16 RX ORDER — NAPROXEN 500 MG/1
500 TABLET ORAL 2 TIMES DAILY WITH MEALS
Qty: 14 TABLET | Refills: 0 | Status: SHIPPED | OUTPATIENT
Start: 2025-05-16 | End: 2025-05-23

## 2025-05-16 NOTE — PROGRESS NOTES
Subjective:   Irene Mcrae is a 59 year old female with no significant PMH who presents for Back Pain     Started a week ago - last Friday - pain is localized to her L iliac crest area  No memory of injury/trauma  No past lower back problems  No recent illnesses  No history of cancer  No new/different exercise regimen  Exacerbated by flexion and rotation  No numbness or tingling to either leg or groin area  No bowel incontinence or urinary retention  Taking ibuprofen - 1 pill per dose per package instructions - helps with the discomfort.  Works as an OT  Tried heat a couple of times  Has a newer mattress    Hasn't had a dexa scan in the past  History of vitamin D deficiency    History/Other:    Chief Complaint Reviewed and Verified  No Further Nursing Notes to   Review  Tobacco Reviewed  Allergies Reviewed  Medications Reviewed    Problem List Reviewed  Medical History Reviewed  Surgical History   Reviewed  OB Status Reviewed  Family History Reviewed  Social History   Reviewed         Tobacco:  She has never smoked tobacco.    Current Medications[1]      Review of Systems:  Review of Systems  10 point review of systems otherwise negative with the exception of HPI and assessment and plan.    Objective:   /68   Pulse 80   Temp 97.7 °F (36.5 °C) (Temporal)   Ht 5' 5\" (1.651 m)   Wt 165 lb (74.8 kg)   LMP 02/15/2015   SpO2 97%   BMI 27.46 kg/m²  Estimated body mass index is 27.46 kg/m² as calculated from the following:    Height as of this encounter: 5' 5\" (1.651 m).    Weight as of this encounter: 165 lb (74.8 kg).      Physical Exam  Vitals reviewed.   Constitutional:       General: She is not in acute distress.  Cardiovascular:      Rate and Rhythm: Normal rate.   Pulmonary:      Effort: Pulmonary effort is normal. No respiratory distress.   Musculoskeletal:      Lumbar back: No swelling, deformity, signs of trauma or spasms. Normal range of motion.      Comments: TTP over L iliac  crest  No midline lumbar spine tenderness   Neurological:      Mental Status: She is alert.         Assessment & Plan:   1. Acute left-sided low back pain without sciatica (Primary)  -     Physical Therapy Referral - External  -     Naproxen; Take 1 tablet (500 mg total) by mouth 2 (two) times daily with meals for 7 days.  Dispense: 14 tablet; Refill: 0  - HPI and physical exam negative for red flag low back symptoms - likely musculoskeletal in nature.  Recommend naproxen 1 pill 2 times daily with food, drink plenty of water.  Referral for physical therapy, she prefers [facility] by her house.  - Additional over-the-counter pain relief with lidocaine patch, Voltaren gel, capsaicin cream.  Alternate ice and heat, massage, and some stretching, all of which was provided in printed discharge instructions.  - Red flags reviewed including loss of bowel control, urinary retention, new numbness or tingling in her groin area, fevers.  She verbalized understanding.  - If no relief with above, would consider lumbar spine films/spine center referral.  2. Iliac crest bone pain  -     Physical Therapy Referral - External  -     Naproxen; Take 1 tablet (500 mg total) by mouth 2 (two) times daily with meals for 7 days.  Dispense: 14 tablet; Refill: 0  - As above.        Return if symptoms worsen or fail to improve.    MCKAYLA Banks, 5/16/2025, 1:55 PM     This note was prepared using Dragon Medical voice recognition dictation software. As a result errors may occur. When identified, these errors have been corrected. While every attempt is made to correct errors during dictation discrepancies may still exist.       [1]   Current Outpatient Medications   Medication Sig Dispense Refill    naproxen 500 MG Oral Tab Take 1 tablet (500 mg total) by mouth 2 (two) times daily with meals for 7 days. 14 tablet 0    escitalopram 20 MG Oral Tab Take 1 tablet (20 mg total) by mouth daily. 90 tablet 3    PREMPRO 0.625-5 MG Oral Tab TAKE 1 TABLET  BY MOUTH EVERY DAY 84 tablet 0

## 2025-05-16 NOTE — PATIENT INSTRUCTIONS
- Naproxen twice daily with food for 7 days, drink plenty of water  - Lidocaine patch / Capsaicin cream / Voltaren gel to painful area  - Ice 20 min 3-4 times daily  - Heat, massage, chiropractic, acupuncture  - Avoid bed rest - try to stay active as pain allows - remaining immobile could increase healing time  - Seek immediate care if you develop numbness/tingling to your groin area or lose control of your bowels, or feel like you're unable to urinate.  - If you are not seeing any improvement with the above, please reach out and we will order an x-ray of your low back, and a referral to our spine center.  Lumbar Extension (Flexibility)    Lie face down on your stomach, forehead on the floor. You can lie on a mat or towel.  Bend your arms next to your body and lift your upper body up on your forearms. Your palms and forearms should be flat on the floor. Keep your stomach and hips on the floor.  Hold your upper body up with your forearms for 20 seconds. Slowly lower back down to the floor.  Repeat 2 times, or as instructed.  Lumbar Flexion (Flexibility)    Lie on your back on the floor, with your knees bent and your feet flat on the floor.  Gently pull your knees up toward your chest. Put your hands under your thighs to help pull your knees up.  Press your lower back down to the floor. Hold for 20 seconds.  Lower your legs back down to the floor and relax.  Repeat 2 times, or as instructed.  Lumbar Rotation    Lie on your back on the floor, with your knees bent and your feet flat on the floor. Don’t press your neck or lower back to the floor.  Lean both of your knees to one side. Turn your head in the opposite direction. Keep your shoulders flat on the floor. Be gentle and don’t push through pain.  Hold for 20 seconds. Then slowly move your knees and head in the other direction.  Repeat 2 to 5 times, or as instructed.   Lumbar Stretch (Flexibility)    Lie on your back on the floor, with your knees bent and your feet  flat on the floor. Don’t press your neck or lower back to the floor.  Pull one knee up toward your chest. Clasp your hands under your thigh to help pull.  Hold for 30 to 60 seconds. Lower your leg back down to the floor.  Repeat 2 times, or as instructed.  Switch legs and repeat.    © 8549-0285 [employer]. [address] [place] [address], [place] [address]. All rights reserved. This information is not intended as a substitute for professional medical care. Always follow your healthcare professional's instructions.

## 2025-05-27 ENCOUNTER — MED REC SCAN ONLY (OUTPATIENT)
Dept: INTERNAL MEDICINE CLINIC | Facility: CLINIC | Age: 60
End: 2025-05-27

## (undated) NOTE — LETTER
9/23/2020          To Whom It May Concern:    Elmer Jacobson is currently under my medical care and may not return to work at this time. Please excuse her till 9/28/20. If you require additional information please contact our office.         Vineet Torres

## (undated) NOTE — Clinical Note
Patient Name: Dionicio Cormier  : 1965  MRN: [id number]  Patient Address: [place] [id number]      Coronavirus Disease 2019 (COVID-19)     [facility] is committed to the safety and well-being of our pat symptoms carefully. If your symptoms get worse, call your healthcare provider immediately. 3. Get rest and stay hydrated. 4. If you have a medical appointment, call the healthcare provider ahead of time and tell them that you have or may have COVID-19. without the use of fever-reducing medications; and  · Improvement in respiratory symptoms (e.g., cough, shortness of breath); and  · At least 10 days have passed since symptoms first appeared OR if asymptomatic patient or date of symptom onset is unclear t convalescent plasma donors must:    · Have had a confirmed diagnosis of COVID-19  · Be symptom-free for at least 14 days*    *Some people will be required to have a repeat COVID-19 test in order to be eligible to donate.  If you’re instructed by Omer Mcgee sallie Post-COVID conditions to be random. Researchers are trying to identify similarities between people with a Post-COVID condition to better understand if there are risk factors. How do I prevent a Post-COVID condition?   The best way to prevent the long-t